# Patient Record
Sex: MALE | Race: OTHER | NOT HISPANIC OR LATINO | URBAN - METROPOLITAN AREA
[De-identification: names, ages, dates, MRNs, and addresses within clinical notes are randomized per-mention and may not be internally consistent; named-entity substitution may affect disease eponyms.]

---

## 2018-11-17 ENCOUNTER — EMERGENCY (EMERGENCY)
Facility: HOSPITAL | Age: 23
LOS: 1 days | Discharge: HOME | End: 2018-11-17
Attending: EMERGENCY MEDICINE | Admitting: EMERGENCY MEDICINE
Payer: MEDICAID

## 2018-11-17 VITALS
OXYGEN SATURATION: 100 % | DIASTOLIC BLOOD PRESSURE: 84 MMHG | TEMPERATURE: 98 F | RESPIRATION RATE: 16 BRPM | HEART RATE: 95 BPM | SYSTOLIC BLOOD PRESSURE: 152 MMHG

## 2018-11-17 PROCEDURE — 99283 EMERGENCY DEPT VISIT LOW MDM: CPT

## 2018-11-17 RX ORDER — DIPHENHYDRAMINE HCL 50 MG
1 CAPSULE ORAL
Qty: 30 | Refills: 0 | OUTPATIENT
Start: 2018-11-17

## 2018-11-17 RX ORDER — FAMOTIDINE 10 MG/ML
1 INJECTION INTRAVENOUS
Qty: 10 | Refills: 0 | OUTPATIENT
Start: 2018-11-17 | End: 2018-11-21

## 2018-11-17 RX ORDER — FAMOTIDINE 10 MG/ML
20 INJECTION INTRAVENOUS ONCE
Qty: 0 | Refills: 0 | Status: COMPLETED | OUTPATIENT
Start: 2018-11-17 | End: 2018-11-17

## 2018-11-17 RX ORDER — DIPHENHYDRAMINE HCL 50 MG
50 CAPSULE ORAL ONCE
Qty: 0 | Refills: 0 | Status: COMPLETED | OUTPATIENT
Start: 2018-11-17 | End: 2018-11-17

## 2018-11-17 RX ADMIN — Medication 60 MILLIGRAM(S): at 15:42

## 2018-11-17 RX ADMIN — FAMOTIDINE 20 MILLIGRAM(S): 10 INJECTION INTRAVENOUS at 15:42

## 2018-11-17 RX ADMIN — Medication 50 MILLIGRAM(S): at 15:42

## 2018-11-17 NOTE — ED PROVIDER NOTE - ATTENDING CONTRIBUTION TO CARE
Pt was seen and evaluated by me. Pt is a 24 y/o male with PMHx of eczema and milk allergies presenting for generalized itchiness and rash X 5 days. Pt states over the past 5 days after drinking a protein shake that might have contained mild having a diffuse rash with itchiness. Pt states it started around the arms and chest and has since spread to back and face. No mucosal involvement. Pt denies any difficulty breath or swallowing. Pt denies any fever, chills, nausea, vomiting, SOB, chest pain, or abd pain. Posterior pharynx clear, no narrowing or rash. Lungs CTA b/l. RRR. Abd soft, non-tender. Noted to have diffuse urticaria with excoriations.

## 2018-11-17 NOTE — ED PROVIDER NOTE - SKIN, MLM
Skin normal color for race, warm, dry. Diffuse urticarial rash over the arms, chest, back, and face, w/ areas of excoriation over the arms

## 2018-11-17 NOTE — ED ADULT TRIAGE NOTE - CHIEF COMPLAINT QUOTE
Patient with a h/o eczema and allergy to milk c/o generalized itchy rash and redness to his body that stared after drinking a protein shake last Monday that may have contained milk.  Denies feeling any throat itchiness or difficulty breathing.  Redness noted to his face, and blotchy itchy rash on bilateral arms. Also c/o stiffness in his joints.

## 2018-11-17 NOTE — ED PROVIDER NOTE - NSFOLLOWUPINSTRUCTIONS_ED_ALL_ED_FT
Follow up with your dermatologist at your scheduled appointment. Continue prednisone 60mg once daily for 4 more days. Continue famotidine 20mg twice daily. Take benadryl 50mg every 6 hours as needed. Return to the ER for worsening symptoms including chest pain, shortness of breath, abdominal pain, throat swelling, fever, or any other new symptoms.

## 2018-11-17 NOTE — ED PROVIDER NOTE - MEDICAL DECISION MAKING DETAILS
diffuse urticarial rash after known allergen exposure, no anaphylactic symptoms, no systemic symptoms or mucosal involvement. Oral steroids, antihistamines. Has derm follow up scheduled in 2 days.

## 2018-11-17 NOTE — ED PROVIDER NOTE - PROGRESS NOTE DETAILS
Dr. Dillard: Pt states feeling walter. Rash and itchiness improved. Sent Prednisone, Benadryl, and Pepcid to pharmacy. Pt has f/u with Dermatologist in 3 days.

## 2018-11-17 NOTE — ED PROVIDER NOTE - OBJECTIVE STATEMENT
23yom w/ eczema and milk allergies drank a protein shake 5 days ago which may have contained milk, since then he has had diffuse urticarial/pruritic rash, initially on the arms and chest, spreading to the back, neck and face. Taking benadryl w/ minimal relief. No fevers or other systemic symptoms. No shortness of breath, stridor, abdominal pain, nausea, vomiting.